# Patient Record
Sex: MALE | Race: BLACK OR AFRICAN AMERICAN | Employment: UNEMPLOYED | URBAN - METROPOLITAN AREA
[De-identification: names, ages, dates, MRNs, and addresses within clinical notes are randomized per-mention and may not be internally consistent; named-entity substitution may affect disease eponyms.]

---

## 2017-12-04 ENCOUNTER — OFFICE VISIT (OUTPATIENT)
Dept: HEMATOLOGY | Age: 62
End: 2017-12-04

## 2017-12-04 ENCOUNTER — HOSPITAL ENCOUNTER (OUTPATIENT)
Dept: LAB | Age: 62
Discharge: HOME | End: 2017-12-04

## 2017-12-04 VITALS
HEIGHT: 69 IN | WEIGHT: 168 LBS | HEART RATE: 74 BPM | OXYGEN SATURATION: 99 % | TEMPERATURE: 98.2 F | SYSTOLIC BLOOD PRESSURE: 131 MMHG | RESPIRATION RATE: 16 BRPM | BODY MASS INDEX: 24.88 KG/M2 | DIASTOLIC BLOOD PRESSURE: 85 MMHG

## 2017-12-04 DIAGNOSIS — B18.2 CHRONIC HEPATITIS C WITHOUT HEPATIC COMA (HCC): Primary | ICD-10-CM

## 2017-12-04 DIAGNOSIS — B18.2 CHRONIC HEPATITIS C WITHOUT HEPATIC COMA (HCC): ICD-10-CM

## 2017-12-04 LAB — SENTARA SPECIMEN COL,SENBCF: NORMAL

## 2017-12-04 PROCEDURE — 99001 SPECIMEN HANDLING PT-LAB: CPT | Performed by: NURSE PRACTITIONER

## 2017-12-04 RX ORDER — LANOLIN ALCOHOL/MO/W.PET/CERES
CREAM (GRAM) TOPICAL
Refills: 0 | COMMUNITY
Start: 2017-11-14

## 2017-12-04 RX ORDER — HYDROCHLOROTHIAZIDE 25 MG/1
TABLET ORAL
Refills: 0 | COMMUNITY
Start: 2017-11-14

## 2017-12-04 RX ORDER — OMEPRAZOLE 20 MG/1
CAPSULE, DELAYED RELEASE ORAL
Refills: 0 | COMMUNITY
Start: 2017-11-14

## 2017-12-04 RX ORDER — ASPIRIN 81 MG/1
TABLET ORAL
Refills: 0 | COMMUNITY
Start: 2017-11-14

## 2017-12-04 RX ORDER — AMLODIPINE BESYLATE 5 MG/1
TABLET ORAL
Refills: 0 | COMMUNITY
Start: 2017-11-17

## 2017-12-04 RX ORDER — LISINOPRIL 20 MG/1
TABLET ORAL
Refills: 0 | COMMUNITY
Start: 2017-11-17

## 2017-12-04 NOTE — PROGRESS NOTES
Mya Maciel is a 60 y.o. male    No chief complaint on file. 1. Have you been to the ER, urgent care clinic or hospitalized since your last visit? NO.     2. Have you seen or consulted any other health care providers outside of the [address] [place] [address] since your last visit (Include any pap smears or colon screening)?  NO  New patient          Learning Assessment 12/4/2017   PRIMARY LEARNER Patient   BARRIERS PRIMARY LEARNER NONE   CO-LEARNER CAREGIVER No   PRIMARY LANGUAGE ENGLISH   LEARNER PREFERENCE PRIMARY LISTENING   ANSWERED BY patient   RELATIONSHIP SELF

## 2017-12-04 NOTE — MR AVS SNAPSHOT
Visit Information Date & Time Provider Department Dept. Phone Encounter #  
 12/4/2017  9:05 AM Roro Tang MD [facility] [address] [id number] Upcoming Health Maintenance Date Due Hepatitis C Screening 1955 DTaP/Tdap/Td series (1 - Tdap) 9/8/1975 FOBT Q 1 YEAR AGE 50-75 9/8/2004 ZOSTER VACCINE AGE 60> 7/8/2014 Influenza Age 5 to Adult 8/1/2017 Allergies as of 12/4/2017  Review Complete On: 12/4/2017 By: Joselin Hough No Known Allergies Current Immunizations  Never Reviewed No immunizations on file. Not reviewed this visit You Were Diagnosed With   
  
 Codes Comments Chronic hepatitis C without hepatic coma (HCC)    -  Primary ICD-10-CM: B18.2 ICD-9-CM: 070.54 Vitals BP Pulse Temp Resp Height(growth percentile) 131/85 (BP 1 Location: Right arm, BP Patient Position: Sitting) 74 98.2 °F (36.8 °C) (Tympanic) 16 5' 8.5\" (1.74 m) Weight(growth percentile) SpO2 BMI Smoking Status 168 lb (76.2 kg) 99% 25.17 kg/m2 Never Smoker BMI and BSA Data Body Mass Index Body Surface Area  
 25.17 kg/m 2 1.92 m 2 Preferred Pharmacy Pharmacy Name Phone [id number] [address] 2 [phone] Your Updated Medication List  
  
   
This list is accurate as of: 12/4/17 10:09 AM.  Always use your most recent med list. amLODIPine 5 mg tablet Commonly known as:  NORVASC  
take 1 tablet by mouth once daily for high blood pressure  
  
 aspirin delayed-release 81 mg tablet  
  
 ferrous sulfate 325 mg (65 mg iron) tablet  
  
 hydroCHLOROthiazide 25 mg tablet Commonly known as:  HYDRODIURIL  
  
 lisinopril 20 mg tablet Commonly known as:  [place]  
take 1 tablet by mouth once daily for high blood pressure  
  
 omeprazole 20 mg capsule Commonly known as:  PRILOSEC To-Do List   
 12/04/2017 Lab:  CBC WITH AUTOMATED DIFF   
  
 12/04/2017 Lab:  FERRITIN   
  
 12/04/2017 Lab:  HCV RNA BY SHANE QL,RFLX TO QT   
  
 12/04/2017 Lab:  HEP A AB, TOTAL   
  
 12/04/2017 Lab:  HEP B SURFACE AB   
  
 12/04/2017 Lab:  HEP B SURFACE AG   
  
 12/04/2017 Lab:  HEP C GENOTYPE   
  
 12/04/2017 Lab:  HEPATIC FUNCTION PANEL   
  
 12/04/2017 Lab:  HEPATITIS B CORE AB, TOTAL   
  
 12/04/2017 Lab:  IRON PROFILE   
  
 12/04/2017 Lab:  METABOLIC PANEL, BASIC   
  
 12/04/2017 Lab:  PROTHROMBIN TIME + INR   
  
 12/04/2017 Imaging:  US ABD LTD W ELASTOGRAPHY Introducing [address]! Irmaon Melvin introduces [employer] patient portal. Now you can access parts of your medical record, email your doctor's office, and request medication refills online. 1. In your internet browser, go to [email or web address] 2. Click on the First Time User? Click Here link in the Sign In box. You will see the New Member Sign Up page. 3. Enter your [employer] Access Code exactly as it appears below. You will not need to use this code after youve completed the sign-up process. If you do not sign up before the expiration date, you must request a new code. · [employer] Access Code: [facility] Expires: 3/4/2018 10:09 AM 
 
4. Enter the last four digits of your Social Security Number (xxxx) and Date of Birth (mm/dd/yyyy) as indicated and click Submit. You will be taken to the next sign-up page. 5. Create a [employer] ID. This will be your [employer] login ID and cannot be changed, so think of one that is secure and easy to remember. 6. Create a [employer] password. You can change your password at any time. 7. Enter your Password Reset Question and Answer. This can be used at a later time if you forget your password. 8. Enter your e-mail address. You will receive e-mail notification when new information is available in [address] 9. Click Sign Up. You can now view and download portions of your medical record. 10. Click the Download Summary menu link to download a portable copy of your medical information. If you have questions, please visit the Frequently Asked Questions section of the [employer] website. Remember, [employer] is NOT to be used for urgent needs. For medical emergencies, dial 911. Now available from your iPhone and Android! Please provide this summary of care documentation to your next provider. Your primary care clinician is listed as Zachary Mcelroy. If you have any questions after today's visit, please call [phone].

## 2017-12-04 NOTE — PROGRESS NOTES
[address] Joshi MD, [place], Cuca Gutierrez, SYBIL Anderson PA-C Katharine Keepers, [facility]   SYBIL Gallego NP        at [address], [address] [address]     [phone]     FAX: [phone]    at [facility], [address] [place] [address], [address]     [phone]     FAX: [phone]       Patient Care Team:  Olvin Grove MD as PCP - General (Family Practice)      Problem List  Date Reviewed: 5/6/2018          Codes Class Noted    Chronic hepatitis C [facility]) ICD-10-CM: B18.2  ICD-9-CM: 070.54  5/6/2018        Hypertension ICD-10-CM: I10  ICD-9-CM: 401.9  5/6/2018        GERD (gastroesophageal reflux disease) ICD-10-CM: K21.9  ICD-9-CM: 530.81  5/6/2018              The physicians listed above have asked me to see Alexis Martins in consultation regarding chronic HCV and its management. All medical records sent by the referring physicians were reviewed. The patient is a 60 y.o. Black male who was noted to have abnormalities in liver chemistries and subsequently tested positive for chronic HCV in 2016. Risk factors for acquiring HCV are IV drug use in 6749-2116. There was no history of acute icteric hepatitis at the time of these risk factors. Imaging of the liver was not performed. An assessment of liver fibrosis with biopsy or elastography has not been performed. The patient has never received treatment for chronic HCV. The most recent laboratory studies are not available to me. The patient notes fatigue, pain in the right side over the liver, constipation.     The patient has not experienced yellowing of the eyes or skin, itching, dark urine, problems concentrating, swelling of the lower extremities,     The patient has mild limitations in functional activities which can be attributed to the liver disease. ALLERGIES  No Known Allergies    MEDICATIONS  Current Outpatient Prescriptions   Medication Sig    amLODIPine (NORVASC) 5 mg tablet take 1 tablet by mouth once daily for high blood pressure    aspirin delayed-release 81 mg tablet     ferrous sulfate 325 mg (65 mg iron) tablet     hydroCHLOROthiazide (HYDRODIURIL) 25 mg tablet     lisinopril (PRINIVIL, ZESTRIL) 20 mg tablet take 1 tablet by mouth once daily for high blood pressure    omeprazole (PRILOSEC) 20 mg capsule      No current facility-administered medications for this visit. SYSTEM REVIEW NOT RELATED TO LIVER DISEASE OR REVIEWED ABOVE:  Constitution systems: Negative for fever, chills, weight gain, weight loss. Eyes: Negative for visual changes. ENT: Negative for sore throat, painful swallowing. Respiratory: Negative for cough, hemoptysis, SOB. Cardiology: Negative for chest pain, palpitations. GI:  Negative for constipation or diarrhea. : Negative for urinary frequency, dysuria, hematuria, nocturia. Skin: Negative for rash. Hematology: Negative for easy bruising, blood clots. Musculo-skelatal: Negative for back pain, muscle pain, weakness. Neurologic: Negative for headaches, dizziness, vertigo, memory problems not related to HE. Psychology: Negative for anxiety, depression. FAMILY HISTORY:  The patient has no knowledge of the father's medical condition. The mother  of cancer. There is no family history of liver disease. SOCIAL HISTORY:  The patient is . The patient has 1 child and 2 grandchildren. The patient has never used tobacco products. The patient has previously consumed alcohol in excess. The patient has been abstinent from alcohol since . The patient is currently receiving disability.       PHYSICAL EXAMINATION:  Visit Vitals    /85 (BP 1 Location: Right arm, BP Patient Position: Sitting)    Pulse 74    Temp 98.2 °F (36.8 °C) (Tympanic)    Resp 16    Ht 5' 8.5\" (1.74 m)    Wt 168 lb (76.2 kg)    SpO2 99%    BMI 25.17 kg/m2     General: No acute distress. Eyes: Sclera anicteric. ENT: No oral lesions. Thyroid normal.  Nodes: No adenopathy. Skin: No spider angiomata. No jaundice. No palmar erythema. Respiratory: Lungs clear to auscultation. Cardiovascular: Regular heart rate. Murmur. No JVD. Abdomen: Tender. Liver size normal to percussion/palpation. Spleen not palpable. No obvious ascites. Extremities: No edema. No muscle wasting. No gross arthritic changes. Neurologic: Alert and oriented. Cranial nerves grossly intact. No asterixis. LABORATORY STUDIES:  Recent liver function panel, CBC with platelet count and BMP are not available. These studies will be performed. SEROLOGIES:  Not available or performed. Testing was performed today. LIVER HISTOLOGY:  Not available or performed    ENDOSCOPIC PROCEDURES:  Not available or performed    RADIOLOGY:  Not available or performed    OTHER TESTING:  Not available or performed    ASSESSMENT AND PLAN:  Chronic HCV of unclear severity. No laboratory studies or imaging available. Will perform laboratory testing to monitor liver function and degree of liver injury. This included BMP, hepatic panel, CBC with platelet count,   INR. Will perform and/or review results of HCV viral load and HCV genotype to define the specific treatment and duration of treatment that will be required. Will perform serologic and virologic studies to assess for other causes of chronic liver disease. Will perform imaging of the liver with ultrasound. The need to assess liver fibrosis was discussed. Sheer wave elastography can assess liver fibrosis and determine if a patient has advanced fibrosis or cirrhosis without the need for liver biopsy. Sheer wave elastography is now available at [facility]. This will be scheduled.     The patient has not previously been treated for HCV. Discussed the treatment alternatives. The SVR/cure rate for HCV now exceeds 90% with just oral anti-viral therapy and no interferon injections or significant side effects for most patients with HCV. The specific treatment is dependent upon genotype, viral load and histology. The patient was directed to continue all current medications at the current dosages. There are no contraindications for the patient to take any medications that are necessary for treatment of other medical issues. The patient was counseled regarding alcohol consumption. The need for vaccination against viral hepatitis A and B will be assessed with serologic and instituted as appropriate. All of the above issues were discussed with the patient. All questions were answered. The patient expressed a clear understanding of the above. [address] [place] [address] in 4 weeks to review all data and determine the treatment plan.     Daiana Ibanez MD  Liver [place] of [address] [place] [address], [address] [place] [address] Street [address]  [phone]

## 2017-12-18 ENCOUNTER — HOSPITAL ENCOUNTER (OUTPATIENT)
Dept: ULTRASOUND IMAGING | Age: 62
Discharge: HOME | End: 2017-12-18
Attending: NURSE PRACTITIONER
Payer: MEDICAID

## 2017-12-18 DIAGNOSIS — B18.2 CHRONIC HEPATITIS C WITHOUT HEPATIC COMA (HCC): ICD-10-CM

## 2017-12-18 PROCEDURE — 0346T US ABD LTD W ELASTOGRAPHY: CPT

## 2018-01-15 ENCOUNTER — OFFICE VISIT (OUTPATIENT)
Dept: HEMATOLOGY | Age: 63
End: 2018-01-15

## 2018-01-15 ENCOUNTER — HOSPITAL ENCOUNTER (OUTPATIENT)
Dept: LAB | Age: 63
Discharge: HOME | End: 2018-01-15

## 2018-01-15 VITALS
DIASTOLIC BLOOD PRESSURE: 89 MMHG | WEIGHT: 161.6 LBS | BODY MASS INDEX: 24.49 KG/M2 | SYSTOLIC BLOOD PRESSURE: 132 MMHG | TEMPERATURE: 97 F | RESPIRATION RATE: 16 BRPM | HEIGHT: 68 IN | OXYGEN SATURATION: 98 % | HEART RATE: 106 BPM

## 2018-01-15 DIAGNOSIS — R74.8 ELEVATED LIVER ENZYMES: ICD-10-CM

## 2018-01-15 DIAGNOSIS — B18.2 CHRONIC HEPATITIS C WITHOUT HEPATIC COMA (HCC): Primary | ICD-10-CM

## 2018-01-15 LAB — SENTARA SPECIMEN COL,SENBCF: NORMAL

## 2018-01-15 PROCEDURE — 99001 SPECIMEN HANDLING PT-LAB: CPT | Performed by: NURSE PRACTITIONER

## 2018-01-15 NOTE — PROGRESS NOTES
[address] Rachel Joshi MD, [address], [place], [place]       [place], [place], [facility]   SYBIL Noriega NP        at [address], [address] [facility]     [facility], [address]     [phone]     FAX: [phone]    at [facility], [address] [place] [address], [address]     [phone]     FAX: [phone]       Patient Care Team:  Patty Renee MD as PCP - General (Family Practice)    Zenobiafaisal Omar [facility] for management of chronic HCV. The active problem list, all pertinent past medical history, medications, liver histology, radiologic findings, and laboratory findings related to the liver disorder were reviewed with the patient. The patient is a 60 y.o. Black male who was noted to have abnormalities in liver chemistries and subsequently tested positive for chronic HCV in 2016. Risk factors for acquiring HCV are IV drug use 8769-0648. There was no history of acute icteric hepatitis at the time of these risk factors. Imaging of the liver and an assessment of liver fibrosis with elastography has been performed. Results of elastography are E Mean: 11.54 kPa which correlates with a fibrosis level of F3. Imaging of the liver also revealed heterogeneous, somewhat bilobed hypoechoic mass is seen within the anterior aspect of the left hepatic lobe, measuring 8.1 x 7.8 x 2.8 cm, with associated asymmetric vascularity on Doppler phase imaging. The patient has never received treatment for chronic HCV. The patient notes fatigue, pain in the right side over the liver, constipation, weight loss, yellowing of the eyes.      The patient has not experienced itching, dark urine, problems concentrating, swelling of the lower extremities,     The patient has moderate limitations in functional activities which can be attributed to the liver disease. ALLERGIES  No Known Allergies    MEDICATIONS  Current Outpatient Prescriptions   Medication Sig    amLODIPine (NORVASC) 5 mg tablet take 1 tablet by mouth once daily for high blood pressure    aspirin delayed-release 81 mg tablet     ferrous sulfate 325 mg (65 mg iron) tablet     hydroCHLOROthiazide (HYDRODIURIL) 25 mg tablet     lisinopril (PRINIVIL, ZESTRIL) 20 mg tablet take 1 tablet by mouth once daily for high blood pressure    omeprazole (PRILOSEC) 20 mg capsule      No current facility-administered medications for this visit. SYSTEM REVIEW NOT RELATED TO LIVER DISEASE OR REVIEWED ABOVE:  Constitution systems: Negative for fever, chills, + weight loss. Eyes: Negative for visual changes. ENT: Negative for sore throat, painful swallowing. Respiratory: Negative for cough, hemoptysis, SOB. Cardiology: Negative for chest pain, palpitations. GI:  Negative for constipation or diarrhea. : Negative for urinary frequency, dysuria, hematuria, nocturia. Skin: Negative for rash. Hematology: Negative for easy bruising, blood clots. Musculo-skelatal: Negative for back pain, muscle pain, + weakness. Neurologic: Negative for headaches, dizziness, vertigo, memory problems not related to HE. Psychology: Negative for anxiety, depression. FAMILY HISTORY:  The patient has no knowledge of the father's medical condition. The mother  of cancer. There is no family history of liver disease. SOCIAL HISTORY:  The patient is . The patient has 1 child and 2 grandchildren. The patient has never used tobacco products. The patient has previously consumed alcohol in excess. The patient has been abstinent from alcohol since . The patient is currently receiving disability.       PHYSICAL EXAMINATION:  Visit Vitals    /89 (BP 1 Location: Left arm, BP Patient Position: Sitting)    Pulse (!) 106    Temp 97 °F (36.1 °C) (Tympanic)    Resp 16    Ht 5' 8\" (1.727 m)    Wt 161 lb 9.6 oz (73.3 kg)    SpO2 98%    BMI 24.57 kg/m2     General: No acute distress. Ill appearing   Eyes: Sclera icteric. ENT: No oral lesions. Thyroid normal.  Nodes: No adenopathy. Skin: No spider angiomata. No jaundice. No palmar erythema. Respiratory: Lungs clear to auscultation. Cardiovascular: Tachycardia. Murmur. No JVD. Abdomen: Non tender. Liver size normal to percussion/palpation. Spleen not palpable. No obvious ascites. Extremities: No edema. No muscle wasting. No gross arthritic changes. Neurologic: Alert and oriented. Cranial nerves grossly intact. No asterixis. LABORATORY STUDIES:  From 12/2017  AST/ALT/ALP/T Bili/ALB: 107/59/170/3.6/4.1  WBC/HB/PLT/INR: 7.5/13.8/144/1.50  BUN/CREAT: 9/0.8    SEROLOGIES:  12/2017. HAV total negative, HBsAntigen negative, anti-HBcore positive, anti-HBsurface negative  anti-HCV positive, HCV Geneotype 1B, HCV RNA Log 5.75 IU/ml,     Ferritin 1368,   iron saturation 79%,     LIVER HISTOLOGY:  12/2017. Sheer wave elastography performed at [facility]. EkPa was E Range:6.26-19.61 kPa, E Mean:11.54 kPa, E Median:11.21 kPa, E Std: 4.60 kPa. The results suggested a fibrosis level of F3. The high E Std suggests that the measurement is not reliable. ENDOSCOPIC PROCEDURES:  Not available or performed    RADIOLOGY:  12/2017. Ultrasound of liver. Echogenic consistent with chronic liver disease. Abnormal somewhat bilobed masslike echogenicity throughout the anterior left hepatic lobe, measuring up to 8.1 cm. Portal vein thrombosis. Portal hypertension with cavernous transformation and portal vein dilation. OTHER TESTING:  Not available or performed    ASSESSMENT AND PLAN:  Chronic HCV with bridging fibrosis. Will perform laboratory testing to monitor liver function and degree of liver injury. This included BMP, hepatic panel, CBC with platelet count,   INR.      Will perform and/or review results of HCV viral load and HCV genotype to define the specific treatment and duration of treatment that will be required. Will perform serologic and virologic studies to assess for other causes of chronic liver disease. Will perform imaging of the liver with dynamic MRI to further assess the lesion found on ultrasound. The patient has not previously been treated for HCV. Discussed the treatment alternatives. The SVR/cure rate for HCV now exceeds 90% with just oral anti-viral therapy and no interferon injections or significant side effects for most patients with HCV. The specific treatment is dependent upon genotype, viral load and histology. Will need to review results of MRI and address more concerning symptoms and abnormal lab work prior to ordering HCV medication. The patient was directed to continue all current medications at the current dosages. There are no contraindications for the patient to take any medications that are necessary for treatment of other medical issues. The patient was counseled regarding alcohol consumption. Vaccination for viral hepatitis A is recommended since the patient has no serologic evidence of previous exposure or vaccination with immunity. Vaccination for viral hepatitis B is not needed. The patient has serologic evidence of prior exposure or vaccination with immunity. All of the above issues were discussed with the patient. All questions were answered. The patient expressed a clear understanding of the above. [facility] [place] [address] in 1 week  to review all data and determine the treatment plan.     Samuel Burton, SYBIL  [address]    [facility], [address] [place] [address] Keyana Antony, [address]  [phone]

## 2018-01-15 NOTE — PROGRESS NOTES
1. Have you been to the ER, urgent care clinic since your last visit? Hospitalized since your last visit? No    2. Have you seen or consulted any other health care providers outside of the [address] [place] [address] since your last visit? Include any pap smears or colon screening.  No

## 2018-01-15 NOTE — MR AVS SNAPSHOT
[facility] 
 
 
 [address], [address] Panchal [address] [place] [address] 
[phone] Patient: Veronica Antonio MRN: [id number] MWZ:[id number] Visit Information Date & Time Provider Department Dept. Phone Encounter #  
 1/15/2018  1:15 PM SYBIL Changslópez [address] of  [address] [id number] Follow-up Instructions Return in about 1 week (around 1/22/2018) for MLS. Your Appointments 1/24/2018 12:45 PM  
Follow Up with Kristine Shaikh MD  
[id number] [facility] Appt Note: 1wk f/up per [facility]  
 [address], [place] [address]  
  
   
 [address], [address] [place] [address] Upcoming Health Maintenance Date Due Hepatitis C Screening 1955 Pneumococcal 19-64 Medium Risk (1 of 1 - PPSV23) 9/8/1973 DTaP/Tdap/Td series (1 - Tdap) 9/8/1975 FOBT Q 1 YEAR AGE 50-75 9/8/2004 ZOSTER VACCINE AGE 60> 7/8/2014 Influenza Age 5 to Adult 8/1/2017 Allergies as of 1/15/2018  Review Complete On: 1/15/2018 By: Virginia Wilcox No Known Allergies Current Immunizations  Never Reviewed No immunizations on file. Not reviewed this visit You Were Diagnosed With   
  
 Codes Comments Chronic hepatitis C without hepatic coma (HCC)    -  Primary ICD-10-CM: B18.2 ICD-9-CM: 070.54 Elevated liver enzymes     ICD-10-CM: R74.8 ICD-9-CM: 790.5 Vitals BP Pulse Temp Resp Height(growth percentile) Weight(growth percentile) 132/89 (BP 1 Location: Left arm, BP Patient Position: Sitting) (!) 106 97 °F (36.1 °C) (Tympanic) 16 5' 8\" (1.727 m) 161 lb 9.6 oz (73.3 kg) SpO2 BMI Smoking Status 98% 24.57 kg/m2 Never Smoker Vitals History BMI and BSA Data Body Mass Index Body Surface Area 24.57 kg/m 2 1.88 m 2 Preferred Pharmacy Pharmacy Name Phone [id number] [address] 2 [phone] Your Updated Medication List  
  
   
This list is accurate as of: 1/15/18  2:05 PM.  Always use your most recent med list. amLODIPine 5 mg tablet Commonly known as:  NORVASC  
take 1 tablet by mouth once daily for high blood pressure  
  
 aspirin delayed-release 81 mg tablet  
  
 ferrous sulfate 325 mg (65 mg iron) tablet  
  
 hydroCHLOROthiazide 25 mg tablet Commonly known as:  HYDRODIURIL  
  
 lisinopril 20 mg tablet Commonly known as:  Cleotis Gray  
take 1 tablet by mouth once daily for high blood pressure  
  
 omeprazole 20 mg capsule Commonly known as:  PRILOSEC We Performed the Following ACTIN (SMOOTH MUSCLE) ANTIBODY P9507172 CPT(R)] AFP WITH AFP-L3% [[id number] Custom] ALPHA-1-ANTITRYPSIN, TOTAL [84955 CPT(R)] ANTINUCLEAR ANTIBODIES, IFA L7387404 CPT(R)] CBC WITH AUTOMATED DIFF [95407 CPT(R)] FERRITIN [70759 CPT(R)] HEMOCHROMATOSIS MUTATION [[id number] Custom] HEPATIC FUNCTION PANEL [00848 CPT(R)] IRON PROFILE [id number] CPT(R)] METABOLIC PANEL, BASIC [66563 CPT(R)] MITOCHONDRIAL M2 AB [id number] CPT(R)] PROTHROMBIN TIME + INR [19512 CPT(R)] Follow-up Instructions Return in about 1 week (around 1/22/2018) for MLS. To-Do List   
 01/15/2018 Lab:  ACTIN (SMOOTH MUSCLE) ANTIBODY   
  
 01/15/2018 Lab:  AFP WITH AFP-L3%   
  
 01/15/2018 Lab:  ALPHA-1-ANTITRYPSIN, TOTAL   
  
 01/15/2018 Lab:  ANTINUCLEAR ANTIBODIES, IFA   
  
 01/15/2018 Lab:  CBC WITH AUTOMATED DIFF   
  
 01/15/2018 Lab:  FERRITIN   
  
 01/15/2018 Lab:  HEMOCHROMATOSIS MUTATION   
  
 01/15/2018 Lab:  HEPATIC FUNCTION PANEL   
  
 01/15/2018 Lab:  IRON PROFILE   
  
 01/15/2018 Lab:  METABOLIC PANEL, BASIC   
  
 01/15/2018 Lab:  MITOCHONDRIAL M2 AB   
  
 01/15/2018   Lab:  PROTHROMBIN TIME + INR   
  
 02/14/2018 Imaging:  MRI ABD W WO CONT Referral Information Referral ID Referred By Referred To  
  
 [id number] Angelic Desir Not Available Visits Status Start Date End Date 1 New Request 1/15/18 1/15/19 If your referral has a status of pending review or denied, additional information will be sent to support the outcome of this decision. Introducing [facility] SERVICES! [facility] introduces [employer] patient portal. Now you can access parts of your medical record, email your doctor's office, and request medication refills online. 1. In your internet browser, go to [email or web address] 2. Click on the First Time User? Click Here link in the Sign In box. You will see the New Member Sign Up page. 3. Enter your [employer] Access Code exactly as it appears below. You will not need to use this code after youve completed the sign-up process. If you do not sign up before the expiration date, you must request a new code. · [employer] Access Code: [facility] Expires: 3/4/2018 10:09 AM 
 
4. Enter the last four digits of your Social Security Number (xxxx) and Date of Birth (mm/dd/yyyy) as indicated and click Submit. You will be taken to the next sign-up page. 5. Create a [employer] ID. This will be your [employer] login ID and cannot be changed, so think of one that is secure and easy to remember. 6. Create a [employer] password. You can change your password at any time. 7. Enter your Password Reset Question and Answer. This can be used at a later time if you forget your password. 8. Enter your e-mail address. You will receive e-mail notification when new information is available in [address] 9. Click Sign Up. You can now view and download portions of your medical record. 10. Click the Download Summary menu link to download a portable copy of your medical information.  
 
If you have questions, please visit the Frequently Asked Questions section of the MST. Remember, [employer] is NOT to be used for urgent needs. For medical emergencies, dial 911. Now available from your iPhone and Android! Please provide this summary of care documentation to your next provider. Your primary care clinician is listed as Sterling El. If you have any questions after today's visit, please call [phone].

## 2018-01-16 LAB
A-G RATIO,AGRAT: 0.7 RATIO (ref 1.1–2.6)
ABSOLUTE LYMPHOCYTE COUNT, 10803: 1.3 K/UL (ref 1–4.8)
ALBUMIN SERPL-MCNC: 3.5 G/DL (ref 3.5–5)
ALP SERPL-CCNC: 203 U/L (ref 40–125)
ALT SERPL-CCNC: 69 U/L (ref 5–40)
ANION GAP SERPL CALC-SCNC: 22 MMOL/L
AST SERPL W P-5'-P-CCNC: 197 U/L (ref 10–37)
BASOPHILS # BLD: 0 K/UL (ref 0–0.2)
BASOPHILS NFR BLD: 0 % (ref 0–2)
BILIRUB SERPL-MCNC: 8.6 MG/DL (ref 0.2–1.2)
BILIRUBIN, DIRECT,CBIL: 5.3 MG/DL (ref 0–0.3)
BUN SERPL-MCNC: 18 MG/DL (ref 6–22)
CALCIUM SERPL-MCNC: 9.5 MG/DL (ref 8.4–10.4)
CHLORIDE SERPL-SCNC: 94 MMOL/L (ref 98–110)
CO2 SERPL-SCNC: 20 MMOL/L (ref 20–32)
CREAT SERPL-MCNC: 0.8 MG/DL (ref 0.8–1.6)
EOSINOPHIL # BLD: 0.2 K/UL (ref 0–0.5)
EOSINOPHIL NFR BLD: 2 % (ref 0–6)
ERYTHROCYTE [DISTWIDTH] IN BLOOD BY AUTOMATED COUNT: 16.9 % (ref 10–16)
FE % SATURATION,PSAT: ABNORMAL % (ref 20–50)
FERRITIN SERPL-MCNC: 2241 NG/ML (ref 22–322)
GFRAA, 66117: >60
GFRNA, 66118: >60
GLOBULIN,GLOB: 4.9 G/DL (ref 2–4)
GLUCOSE SERPL-MCNC: 89 MG/DL (ref 70–99)
GRANULOCYTES,GRANS: 73 % (ref 40–75)
HCT VFR BLD AUTO: 39 % (ref 39.3–51.6)
HGB BLD-MCNC: 13.7 G/DL (ref 13.1–17.2)
INR PPP: 1.9 (ref 0.89–1.29)
IRON,IRN: 222 MCG/DL (ref 45–160)
LYMPHOCYTES, LYMLT: 18 % (ref 27–45)
MCH RBC QN AUTO: 35 PG (ref 26–34)
MCHC RBC AUTO-ENTMCNC: 35 G/DL (ref 32–36)
MCV RBC AUTO: 100 FL (ref 80–95)
MONOCYTES # BLD: 0.4 K/UL (ref 0.1–0.9)
MONOCYTES NFR BLD: 6 % (ref 3–9)
NEUTROPHILS # BLD AUTO: 5.4 K/UL (ref 1.8–7.7)
PLATELET # BLD AUTO: 153 K/UL (ref 140–440)
PMV BLD AUTO: 14 FL (ref 6–10.8)
POTASSIUM SERPL-SCNC: 4.1 MMOL/L (ref 3.5–5.5)
PROT SERPL-MCNC: 8.4 G/DL (ref 6.2–8.1)
PROTHROMBIN TIME: 18.2 SEC (ref 9–13)
RBC # BLD AUTO: 3.92 M/UL (ref 3.8–5.8)
SODIUM SERPL-SCNC: 136 MMOL/L (ref 133–145)
TIBC,TIBC: ABNORMAL MCG/DL (ref 228–428)
UIBC SERPL-MCNC: <20 MCG/DL (ref 110–370)
WBC # BLD AUTO: 7.3 K/UL (ref 4–11)

## 2018-01-17 ENCOUNTER — TELEPHONE (OUTPATIENT)
Dept: HEMATOLOGY | Age: 63
End: 2018-01-17

## 2018-01-17 LAB
ACTIN (SMOOTH MUSCLE) ANTIBODY, 006644: 33 UNITS
ALPHA-1 ANTITRYPSIN,A1ATR: 192 MG/DL
ANA HOMOGENEOUS, 8012: NEGATIVE TITER
ANA NUCLEOLAR, 8013: NEGATIVE TITER
ANA PERIPHERAL, 8014: NEGATIVE TITER
ANA SPECKLED, 8011: NEGATIVE TITER
CENTROMERE ANTIBODIES, 8254: NEGATIVE TITER
MICHOCHONDRIAL (M2) AB, 006652: 13.7 UNITS

## 2018-01-17 NOTE — TELEPHONE ENCOUNTER
Attempted to call patient regarding abnormal labs and the need for patient to go to the emergency room. Unable to leave a message.

## 2018-01-17 NOTE — TELEPHONE ENCOUNTER
Attempted to call patient regarding abnormal labs and need for patient to go the the emergency room. Unable to leave a message.

## 2018-01-17 NOTE — TELEPHONE ENCOUNTER
Left message to call me ASAP regarding abnormal lab work. Will continue to attempt to reach patient.

## 2018-01-17 NOTE — TELEPHONE ENCOUNTER
Spoke with patient to advise he needs to go to the emergency room for evaluation regarding abnormal labs. Patient states understanding and was in agreement to go. States he will go to [facility].

## 2018-01-18 ENCOUNTER — DOCUMENTATION ONLY (OUTPATIENT)
Dept: HEMATOLOGY | Age: 63
End: 2018-01-18

## 2018-01-18 LAB
AFP, SERUM, 141303: 1347.5 NG/ML
AFP-L3%, SERUM, 141302: 66.8 %

## 2018-01-22 ENCOUNTER — HOSPITAL ENCOUNTER (EMERGENCY)
Age: 63
Discharge: HOME | End: 2018-01-22
Attending: INTERNAL MEDICINE
Payer: MEDICAID

## 2018-01-22 ENCOUNTER — HOSPITAL ENCOUNTER (OUTPATIENT)
Dept: MRI IMAGING | Age: 63
Discharge: HOME | End: 2018-01-22
Attending: NURSE PRACTITIONER
Payer: MEDICAID

## 2018-01-22 ENCOUNTER — APPOINTMENT (OUTPATIENT)
Dept: GENERAL RADIOLOGY | Age: 63
End: 2018-01-22
Attending: PHYSICIAN ASSISTANT
Payer: MEDICAID

## 2018-01-22 VITALS
TEMPERATURE: 97.6 F | HEART RATE: 102 BPM | DIASTOLIC BLOOD PRESSURE: 86 MMHG | OXYGEN SATURATION: 100 % | SYSTOLIC BLOOD PRESSURE: 132 MMHG | RESPIRATION RATE: 17 BRPM

## 2018-01-22 DIAGNOSIS — B18.2 CHRONIC HEPATITIS C WITHOUT HEPATIC COMA (HCC): ICD-10-CM

## 2018-01-22 DIAGNOSIS — K76.9 CHRONIC LIVER DISEASE: Primary | ICD-10-CM

## 2018-01-22 DIAGNOSIS — R53.83 FATIGUE, UNSPECIFIED TYPE: ICD-10-CM

## 2018-01-22 LAB
ALBUMIN SERPL-MCNC: 2.5 G/DL (ref 3.4–5)
ALBUMIN/GLOB SERPL: 0.5 {RATIO} (ref 0.8–1.7)
ALP SERPL-CCNC: 184 U/L (ref 45–117)
ALT SERPL-CCNC: 95 U/L (ref 16–61)
ANION GAP SERPL CALC-SCNC: 16 MMOL/L (ref 3–18)
APTT PPP: 40.4 SEC (ref 23–36.4)
AST SERPL-CCNC: 305 U/L (ref 15–37)
BASOPHILS # BLD: 0 K/UL (ref 0–0.06)
BASOPHILS NFR BLD: 0 % (ref 0–2)
BILIRUB SERPL-MCNC: 8.6 MG/DL (ref 0.2–1)
BUN SERPL-MCNC: 30 MG/DL (ref 7–18)
BUN/CREAT SERPL: 19 (ref 12–20)
CALCIUM SERPL-MCNC: 9.3 MG/DL (ref 8.5–10.1)
CHLORIDE SERPL-SCNC: 100 MMOL/L (ref 100–108)
CK MB CFR SERPL CALC: 2.6 % (ref 0–4)
CK MB SERPL-MCNC: 1 NG/ML (ref 5–25)
CK SERPL-CCNC: 39 U/L (ref 39–308)
CO2 SERPL-SCNC: 21 MMOL/L (ref 21–32)
CREAT SERPL-MCNC: 1.55 MG/DL (ref 0.6–1.3)
DIFFERENTIAL METHOD BLD: ABNORMAL
EOSINOPHIL # BLD: 0.1 K/UL (ref 0–0.4)
EOSINOPHIL NFR BLD: 1 % (ref 0–5)
ERYTHROCYTE [DISTWIDTH] IN BLOOD BY AUTOMATED COUNT: 16.7 % (ref 11.6–14.5)
GLOBULIN SER CALC-MCNC: 5.4 G/DL (ref 2–4)
GLUCOSE SERPL-MCNC: 91 MG/DL (ref 74–99)
HCT VFR BLD AUTO: 35.5 % (ref 36–48)
HEREDITARY  HEMOCHROMATOSIS, 551366: NORMAL
HGB BLD-MCNC: 13 G/DL (ref 13–16)
INR PPP: 1.7 (ref 0.8–1.2)
LYMPHOCYTES # BLD: 1 K/UL (ref 0.9–3.6)
LYMPHOCYTES NFR BLD: 11 % (ref 21–52)
MCH RBC QN AUTO: 34.8 PG (ref 24–34)
MCHC RBC AUTO-ENTMCNC: 36.6 G/DL (ref 31–37)
MCV RBC AUTO: 94.9 FL (ref 74–97)
MONOCYTES # BLD: 0.9 K/UL (ref 0.05–1.2)
MONOCYTES NFR BLD: 9 % (ref 3–10)
NEUTS SEG # BLD: 7.2 K/UL (ref 1.8–8)
NEUTS SEG NFR BLD: 79 % (ref 40–73)
PLATELET # BLD AUTO: 142 K/UL (ref 135–420)
PMV BLD AUTO: 10.6 FL (ref 9.2–11.8)
POTASSIUM SERPL-SCNC: 4.3 MMOL/L (ref 3.5–5.5)
PROT SERPL-MCNC: 7.9 G/DL (ref 6.4–8.2)
PROTHROMBIN TIME: 19.3 SEC (ref 11.5–15.2)
RBC # BLD AUTO: 3.74 M/UL (ref 4.7–5.5)
SODIUM SERPL-SCNC: 137 MMOL/L (ref 136–145)
TROPONIN I SERPL-MCNC: <0.02 NG/ML (ref 0–0.06)
WBC # BLD AUTO: 9.2 K/UL (ref 4.6–13.2)

## 2018-01-22 PROCEDURE — 96361 HYDRATE IV INFUSION ADD-ON: CPT

## 2018-01-22 PROCEDURE — 74183 MRI ABD W/O CNTR FLWD CNTR: CPT

## 2018-01-22 PROCEDURE — 85025 COMPLETE CBC W/AUTO DIFF WBC: CPT | Performed by: PHYSICIAN ASSISTANT

## 2018-01-22 PROCEDURE — 85730 THROMBOPLASTIN TIME PARTIAL: CPT | Performed by: PHYSICIAN ASSISTANT

## 2018-01-22 PROCEDURE — 77030021566 MRI ABD W WO CONT

## 2018-01-22 PROCEDURE — 93005 ELECTROCARDIOGRAM TRACING: CPT

## 2018-01-22 PROCEDURE — 74011250636 HC RX REV CODE- 250/636: Performed by: PHYSICIAN ASSISTANT

## 2018-01-22 PROCEDURE — 74022 RADEX COMPL AQT ABD SERIES: CPT

## 2018-01-22 PROCEDURE — 96374 THER/PROPH/DIAG INJ IV PUSH: CPT

## 2018-01-22 PROCEDURE — 82550 ASSAY OF CK (CPK): CPT | Performed by: PHYSICIAN ASSISTANT

## 2018-01-22 PROCEDURE — 99284 EMERGENCY DEPT VISIT MOD MDM: CPT

## 2018-01-22 PROCEDURE — 80053 COMPREHEN METABOLIC PANEL: CPT | Performed by: PHYSICIAN ASSISTANT

## 2018-01-22 PROCEDURE — 74011250636 HC RX REV CODE- 250/636: Performed by: NURSE PRACTITIONER

## 2018-01-22 PROCEDURE — 85610 PROTHROMBIN TIME: CPT | Performed by: PHYSICIAN ASSISTANT

## 2018-01-22 PROCEDURE — A9577 INJ MULTIHANCE: HCPCS | Performed by: NURSE PRACTITIONER

## 2018-01-22 RX ORDER — ONDANSETRON 2 MG/ML
4 INJECTION INTRAMUSCULAR; INTRAVENOUS
Status: COMPLETED | OUTPATIENT
Start: 2018-01-22 | End: 2018-01-22

## 2018-01-22 RX ADMIN — ONDANSETRON 4 MG: 2 INJECTION INTRAMUSCULAR; INTRAVENOUS at 11:22

## 2018-01-22 RX ADMIN — SODIUM CHLORIDE 1000 ML: 900 INJECTION, SOLUTION INTRAVENOUS at 11:22

## 2018-01-22 RX ADMIN — GADOBENATE DIMEGLUMINE 15 ML: 529 INJECTION, SOLUTION INTRAVENOUS at 09:47

## 2018-01-22 NOTE — ED PROVIDER NOTES
EMERGENCY DEPARTMENT HISTORY AND PHYSICAL EXAM    Date: 1/22/2018  Patient Name: Hays Runner    History of Presenting Illness     Chief Complaint   Patient presents with    Fatigue    Hemoptysis         History Provided By: Patient    Chief Complaint: Fatigue  Duration: 2 Weeks  Timing:  Acute  Location: N/A  Quality: soreness  Severity: 5 out of 10  Associated Symptoms: decreased appetite, epistaxis, hemoptysis, generalized weakness, abdominal pain (5/10) described as soreness, abdominal distention, subjective fever, blurred/spotty vision, nausea, and vomiting    Additional History (Context):   10:41 AM  Hays Runner is a 60 y.o. male with PMHx of Hepatitis C who presents to the emergency department C/O fatigue onset 2 weeks ago. Associated sxs include decreased appetite, epistaxis, hemoptysis, generalized weakness, abdominal pain (5/10) described as soreness, abdominal distention, subjective fever, blurred/spotty vision, nausea, and vomiting. Pt sees Dr. Alberta Ontiveros MD for Hepatitis C. Last saw him 1 week ago and states he ordered an MRI. He was sent to the ED for further evaluation by MRI today. Pt denies tobacco use and any other sxs or complaints. PCP: Jessica Maciel MD    Current Outpatient Prescriptions   Medication Sig Dispense Refill    amLODIPine (NORVASC) 5 mg tablet take 1 tablet by mouth once daily for high blood pressure  0    aspirin delayed-release 81 mg tablet   0    ferrous sulfate 325 mg (65 mg iron) tablet   0    hydroCHLOROthiazide (HYDRODIURIL) 25 mg tablet   0    lisinopril (PRINIVIL, ZESTRIL) 20 mg tablet take 1 tablet by mouth once daily for high blood pressure  0    omeprazole (PRILOSEC) 20 mg capsule   0       Past History     Past Medical History:  Past Medical History:   Diagnosis Date    Hepatitis C     Infectious disease     Hepatitis C    Liver disease     Hepatitis C       Past Surgical History:  History reviewed. No pertinent surgical history.     Family History:  History reviewed. No pertinent family history. Social History:  Social History   Substance Use Topics    Smoking status: Never Smoker    Smokeless tobacco: Never Used    Alcohol use No       Allergies:  No Known Allergies      Review of Systems   Review of Systems   Constitutional: Positive for appetite change (decreased), fatigue and fever (subjective). HENT: Positive for nosebleeds. Eyes: Positive for visual disturbance (blurred/spotty). Gastrointestinal: Positive for abdominal distention, abdominal pain (soreness), nausea and vomiting. Neurological: Positive for weakness (generalized). All other systems reviewed and are negative. Physical Exam     Vitals:    01/22/18 1039 01/22/18 1125   BP: 134/90 136/89   Pulse: (!) 107 (!) 104   Resp: 16 17   Temp: 97.9 °F (36.6 °C) 97.6 °F (36.4 °C)   SpO2: 100% 100%     Physical Exam   Constitutional: He is oriented to person, place, and time. Thin appears older than stated age   HENT:   Head: Normocephalic and atraumatic. Poor dentition   Eyes: Conjunctivae and EOM are normal. Pupils are equal, round, and reactive to light. Scleral icterus is present. Neck: Normal range of motion. Neck supple. Cardiovascular: Normal rate and regular rhythm. Pulmonary/Chest: Effort normal and breath sounds normal.   Abdominal: Bowel sounds are normal. There is no tenderness. Musculoskeletal: Normal range of motion. He exhibits no edema or tenderness. Neurological: He is alert and oriented to person, place, and time. Skin: Skin is warm and dry. Psychiatric: He has a normal mood and affect. His behavior is normal.   Nursing note and vitals reviewed.         Diagnostic Study Results     Labs -     Recent Results (from the past 12 hour(s))   CBC WITH AUTOMATED DIFF    Collection Time: 01/22/18 10:45 AM   Result Value Ref Range    WBC 9.2 4.6 - 13.2 K/uL    RBC 3.74 (L) 4.70 - 5.50 M/uL    HGB 13.0 13.0 - 16.0 g/dL    HCT 35.5 (L) 36.0 - 48.0 % MCV 94.9 74.0 - 97.0 FL    MCH 34.8 (H) 24.0 - 34.0 PG    MCHC 36.6 31.0 - 37.0 g/dL    RDW 16.7 (H) 11.6 - 14.5 %    PLATELET 511 776 - 066 K/uL    MPV 10.6 9.2 - 11.8 FL    NEUTROPHILS 79 (H) 40 - 73 %    LYMPHOCYTES 11 (L) 21 - 52 %    MONOCYTES 9 3 - 10 %    EOSINOPHILS 1 0 - 5 %    BASOPHILS 0 0 - 2 %    ABS. NEUTROPHILS 7.2 1.8 - 8.0 K/UL    ABS. LYMPHOCYTES 1.0 0.9 - 3.6 K/UL    ABS. MONOCYTES 0.9 0.05 - 1.2 K/UL    ABS. EOSINOPHILS 0.1 0.0 - 0.4 K/UL    ABS. BASOPHILS 0.0 0.0 - 0.06 K/UL    DF AUTOMATED     METABOLIC PANEL, COMPREHENSIVE    Collection Time: 01/22/18 10:45 AM   Result Value Ref Range    Sodium 137 136 - 145 mmol/L    Potassium 4.3 3.5 - 5.5 mmol/L    Chloride 100 100 - 108 mmol/L    CO2 21 21 - 32 mmol/L    Anion gap 16 3.0 - 18 mmol/L    Glucose 91 74 - 99 mg/dL    BUN 30 (H) 7.0 - 18 MG/DL    Creatinine 1.55 (H) 0.6 - 1.3 MG/DL    BUN/Creatinine ratio 19 12 - 20      GFR est AA 55 (L) >60 ml/min/1.73m2    GFR est non-AA 46 (L) >60 ml/min/1.73m2    Calcium 9.3 8.5 - 10.1 MG/DL    Bilirubin, total 8.6 (H) 0.2 - 1.0 MG/DL    ALT (SGPT) 95 (H) 16 - 61 U/L    AST (SGOT) 305 (H) 15 - 37 U/L    Alk.  phosphatase 184 (H) 45 - 117 U/L    Protein, total 7.9 6.4 - 8.2 g/dL    Albumin 2.5 (L) 3.4 - 5.0 g/dL    Globulin 5.4 (H) 2.0 - 4.0 g/dL    A-G Ratio 0.5 (L) 0.8 - 1.7     PROTHROMBIN TIME + INR    Collection Time: 01/22/18 10:45 AM   Result Value Ref Range    Prothrombin time 19.3 (H) 11.5 - 15.2 sec    INR 1.7 (H) 0.8 - 1.2     CARDIAC PANEL,(CK, CKMB & TROPONIN)    Collection Time: 01/22/18 10:45 AM   Result Value Ref Range    CK 39 39 - 308 U/L    CK - MB 1.0 <3.6 ng/ml    CK-MB Index 2.6 0.0 - 4.0 %    Troponin-I, Qt. <0.02 0.00 - 0.06 NG/ML   PTT    Collection Time: 01/22/18 10:45 AM   Result Value Ref Range    aPTT 40.4 (H) 23.0 - 36.4 SEC       Radiologic Studies -   XR ABD ACUTE W 1 V CHEST   Final Result   IMPRESSION: Underexpansion of the lungs with asymmetric elevation of the right  hemidiaphragm with associated right basilar volume loss/atelectasis due to large  volume abdominal ascites, better appreciated on the same day MRI. As read by the radiologist.     CT Results  (Last 48 hours)    None        CXR Results  (Last 48 hours)               01/22/18 1158  XR ABD ACUTE W 1 V CHEST Final result    Impression:  IMPRESSION: Underexpansion of the lungs with asymmetric elevation of the right   hemidiaphragm with associated right basilar volume loss/atelectasis due to large   volume abdominal ascites, better appreciated on the same day MRI. Narrative:  Study: Thoracoabdominal radiographs, supine, semiupright, and right side down   decubitus views. INDICATION: Cough and abdominal pain. COMPARISON: Same day abdominal MRI. FINDINGS: Semiupright view of the chest and upper abdomen demonstrates marked   elevation of the right hemidiaphragm with associated volume loss in the right   hemithorax. The right are present. Although the heart size is grossly normal. No   focal airspace disease, pneumothorax, or effusion. The decubitus view demonstrates no layering pleural fluid. There is diffuse hazy   attenuation of the abdomen with centralization of partially air-filled bowel   loops, compatible with a large amount of ascites as better demonstrated on the   concurrent MRI. Lower lumbar spondylosis is noted. No acute osseous findings. 12:09 PM  RADIOLOGY FINDINGS  Abd X-ray shows small pleural effusion on the right. Pending review by Radiologist  Recorded by Alen Fontaine ED Scribe, as dictated by Trevor Parada PA-C. Medications given in the ED-  Medications   ondansetron [facility]) injection 4 mg (4 mg IntraVENous Given 1/22/18 1122)   sodium chloride 0.9 % bolus infusion 1,000 mL (1,000 mL IntraVENous New Bag 1/22/18 1122)         Medical Decision Making   I am the first provider for this patient.     I reviewed the vital signs, available nursing notes, past medical history, past surgical history, family history and social history. Vital Signs-Reviewed the patient's vital signs. Pulse Oximetry Analysis - 100% on RA     Cardiac Monitor:  Rate: 107 bpm  Rhythm: Sinus tachycardia    EKG interpretation: (Preliminary)  Rhythm: NSR. Rate: 95 bpm; Nonspecific T wave abnormality. EKG read by Trevor Parada PA-C at 12:39 PM    Records Reviewed: Nursing Notes and Old Medical Records     12/4/17 US ABD IMPRESSION:     1. Abnormal somewhat bilobed masslike echogenicity throughout the anterior left  hepatic lobe, measuring up to 8.1 cm. Hepatic protocol MRI could better evaluate  this lesion. 2. Portal vein thrombosis. Additional secondary findings of portal hypertension  with cavernous transformation and portal vein dilation. 3. Borderline hydropic gallbladder distention with several gallstones but no  secondary findings of cholecystitis. 4. Hepatic elastography, as described. 5. Punctate hyperechoic right renal foci, probably vascular calcifications. Procedures:  Procedures    ED Course:   10:41 AM Initial assessment performed. The patients presenting problems have been discussed, and they are in agreement with the care plan formulated and outlined with them. I have encouraged them to ask questions as they arise throughout their visit. CONSULT NOTE:   11:59 AM  Trevor Parada PA-C spoke with Samuel Burton NP at [facility]  Specialty: NP for Hepatology  Discussed pt's hx, disposition, and available diagnostic and imaging results over the telephone. Reviewed care plans. Consulting NP stated pt has a known mass in his liver that is highly suspicious for cancer, which is why the MRI was ordered. He received the MRI today. He has a follow up appointment with them in 2 days, where they will review the MRI and potentially set up IR at [facility] for cancer treatment dependent on MRI findings. No additional labs requested at this time. PROGRESS NOTE:   12:15 PM  Pt resting comfortably. Advised of labs and discussion with NP Poly Loera. Ginger ale provided to pt. He is tolerating PO. He has provided me with two phone numbers ([phone] and Jacque Smith [phone]) asking me to call for them to take him home. CONSULT NOTE:   12:37 PM  Trevor Parada PA-C spoke with Elaina Caldwell MD   Specialty: ED Attending  Discussed pt's hx, disposition, and available diagnostic and imaging results in person. Reviewed care plans. Consulting physician agrees with labs and plan. Diagnosis and Disposition       DISCHARGE NOTE:  1:53 PM  Anabela Slice  results have been reviewed with him. He has been counseled regarding his diagnosis, treatment, and plan. He verbally conveys understanding and agreement of the signs, symptoms, diagnosis, treatment and prognosis and additionally agrees to follow up as discussed. He also agrees with the care-plan and conveys that all of his questions have been answered. I have also provided discharge instructions for him that include: educational information regarding their diagnosis and treatment, and list of reasons why they would want to return to the ED prior to their follow-up appointment, should his condition change. He has been provided with education for proper emergency department utilization. CLINICAL IMPRESSION:    1. Chronic liver disease    2. Fatigue, unspecified type        PLAN:  1. D/C Home  2. Current Discharge Medication List        3. Follow-up Information     Follow up With Details Comments [address], [place] Go in 2 days Go to [facility]-ER appointment as scheduled in 2 days. [address] [place] [address] [facility]      [facility] EMERGENCY DEPT Go to As needed, If symptoms worsen [address] [id number]  [phone]        _______________________________    Attestations:   This note is prepared by Aden Barnett, acting as Scribe for [employer]. Yael Joyce PA-C:  The scribe's documentation has been prepared under my direction and personally reviewed by me in its entirety.   I confirm that the note above accurately reflects all work, treatment, procedures, and medical decision making performed by me.  _______________________________

## 2018-01-22 NOTE — DISCHARGE INSTRUCTIONS
Fatigue: Care Instructions  Your Care Instructions    Fatigue is a feeling of tiredness, exhaustion, or lack of energy. You may feel fatigue because of too much or not enough activity. It can also come from stress, lack of sleep, boredom, and poor diet. Many medical problems, such as viral infections, can cause fatigue. Emotional problems, especially depression, are often the cause of fatigue. Fatigue is most often a symptom of another problem. Treatment for fatigue depends on the cause. For example, if you have fatigue because you have a certain health problem, treating this problem also treats your fatigue. If depression or anxiety is the cause, treatment may help. Follow-up care is a key part of your treatment and safety. Be sure to make and go to all appointments, and call your doctor if you are having problems. It's also a good idea to know your test results and keep a list of the medicines you take. How can you care for yourself at home? · Get regular exercise. But don't overdo it. Go back and forth between rest and exercise. · Get plenty of rest.  · Eat a healthy diet. Do not skip meals, especially breakfast.  · Reduce your use of caffeine, tobacco, and alcohol. Caffeine is most often found in coffee, tea, cola drinks, and chocolate. · Limit medicines that can cause fatigue. This includes tranquilizers and cold and allergy medicines. When should you call for help? Watch closely for changes in your health, and be sure to contact your doctor if:  ? · You have new symptoms such as fever or a rash. ? · Your fatigue gets worse. ? · You have been feeling down, depressed, or hopeless. Or you may have lost interest in things that you usually enjoy. ? · You are not getting better as expected. Where can you learn more? Go to http://reynold-helena.info/. Enter Y500 in the search box to learn more about \"Fatigue: Care Instructions. \"  Current as of: March 20, 2017  Content Version: 11.4  © 4270-9861 [employer], [employer]. Care instructions adapted under license by [employer] (which disclaims liability or warranty for this information). If you have questions about a medical condition or this instruction, always ask your healthcare professional. [address] any warranty or liability for your use of this information. Liver Disease Diet: Care Instructions  Your Care Instructions    The liver does many jobs that are vital to the rest of your body. When something is wrong with the liver, your body may not get the nutrition it needs. It is important that you eat a healthy diet that includes a variety of foods from the basic food groups: grains, vegetables, fruits, dairy, and protein foods. Follow your doctor's instructions for eating carbohydrate, protein, and fat in the right amounts for you. Your doctor also may limit salt or take salt out of your diet to help protect the liver. Always talk with your doctor or dietitian before you make changes in your diet. Follow-up care is a key part of your treatment and safety. Be sure to make and go to all appointments, and call your doctor if you are having problems. It's also a good idea to know your test results and keep a list of the medicines you take. How can you care for yourself at home? · Work with your doctor or dietitian to create a food plan that guides your daily food choices. · Eat a balanced diet. Do not skip meals or go for many hours without eating. If you eat several small meals during the day, you have a better chance of getting the extra calories your body needs for energy. · Your doctor may recommend a high-carbohydrate diet to get enough calories, since you may have to limit fat. You may need to spread carbohydrate throughout your meals and snacks to control the amount of sugar in your blood. Eat six small meals a day, rather than three big ones.  Carbohydrate is found in:  ¨ Whole-grain and refined breads and cereals. ¨ Some vegetables. ¨ Cooked beans (such as kidney or black beans) and peas (such as lentils or split peas). ¨ Fruits. ¨ Low-fat or nonfat milk and milk products, which also supply protein. ¨ Candy, table sugar, and sugary soda drinks (try to limit these). · Follow your doctor's or dietitian's instructions on how to get the right amount of protein in your diet. Examples of animal protein are:  [employer], fish, and poultry. ¨ Eggs. ¨ Milk and milk products. · Your doctor or dietitian may ask you to eat a certain amount of protein that comes from plants (rather than protein that comes from animals). You can get plant protein from foods such as:  ¨ Cooked dried beans and peas. ¨ Peanut butter, nuts, and seeds. ¨ Tofu. · Limit salt, if your doctor tells you to. This will help prevent fluid buildup in your belly and chest, which can cause serious problems. Salt is in many prepared foods, such as garcia, canned foods, snack foods, sauces, and soups. Look for reduced-salt products. · Your doctor may recommend vitamin and mineral supplements. However, do not take any other medicine, including over-the-counter medicines, vitamins, and herbal products, without talking to your doctor first.  · Do NOT take acetaminophen (Tylenol), ibuprofen (Advil, Motrin), or naproxen (Aleve). These can cause more liver damage. · Do not drink any alcohol. It can harm your liver. Talk to your doctor if you need help to stop drinking. · If you have a loss of appetite or have nausea or vomiting, try to:  ¨ Stay away from foods and food smells that make you feel worse. ¨ Avoid greasy or fatty foods. ¨ Eat food that settles your stomach when it feels upset. Try crackers, dry toast, or fifi (fifi tea, hard fifi candy, or crystallized fifi). When should you call for help? Watch closely for changes in your health, and be sure to contact your doctor if you have any problems.   Where can you learn more?  Go to http://reynold-helena.info/. Enter F027 in the search box to learn more about \"Liver Disease Diet: Care Instructions. \"  Current as of: May 12, 2017  Content Version: 11.4  © 4918-3148 [employer]. Care instructions adapted under license by [employer] (which disclaims liability or warranty for this information). If you have questions about a medical condition or this instruction, always ask your healthcare professional. [address] any warranty or liability for your use of this information.

## 2018-01-22 NOTE — ED TRIAGE NOTES
Pt states that he has been very weak and no energy; pt went for outpatient MRI and was \"spitting up blood\"  Pt states that started several days ago; Pt states he has had much to eat in 2 weeks;   Sees Dr Nichol Norton for hepatitis C and his office ordered the MRI today;

## 2018-01-22 NOTE — PROGRESS NOTES
Reviewed with Dr. Jack Deal. Patient to follow up in office 1/24/18 to review results and discuss plan.

## 2018-01-28 LAB
ATRIAL RATE: 95 BPM
CALCULATED P AXIS, ECG09: 25 DEGREES
CALCULATED R AXIS, ECG10: 41 DEGREES
CALCULATED T AXIS, ECG11: -6 DEGREES
DIAGNOSIS, 93000: NORMAL
P-R INTERVAL, ECG05: 116 MS
Q-T INTERVAL, ECG07: 336 MS
QRS DURATION, ECG06: 76 MS
QTC CALCULATION (BEZET), ECG08: 422 MS
VENTRICULAR RATE, ECG03: 95 BPM

## 2018-01-29 ENCOUNTER — TELEPHONE (OUTPATIENT)
Dept: HEMATOLOGY | Age: 63
End: 2018-01-29

## 2018-01-29 NOTE — TELEPHONE ENCOUNTER
I called to speak with patient regarding missed appointment last week and need to discuss MRI results. Daughter answered and informed me that the patient passed away 1/26/18.

## 2018-05-06 PROBLEM — K21.9 GERD (GASTROESOPHAGEAL REFLUX DISEASE): Status: ACTIVE | Noted: 2018-05-06

## 2018-05-06 PROBLEM — B18.2 CHRONIC HEPATITIS C (HCC): Status: ACTIVE | Noted: 2018-05-06

## 2018-05-06 PROBLEM — I10 HYPERTENSION: Status: ACTIVE | Noted: 2018-05-06
